# Patient Record
Sex: MALE | Race: WHITE | Employment: FULL TIME | URBAN - METROPOLITAN AREA
[De-identification: names, ages, dates, MRNs, and addresses within clinical notes are randomized per-mention and may not be internally consistent; named-entity substitution may affect disease eponyms.]

---

## 2024-03-04 ENCOUNTER — HOSPITAL ENCOUNTER (EMERGENCY)
Age: 29
Discharge: HOME | End: 2024-03-04
Attending: EMERGENCY MEDICINE
Payer: COMMERCIAL

## 2024-03-04 ENCOUNTER — APPOINTMENT (OUTPATIENT)
Dept: GENERAL RADIOLOGY | Age: 29
End: 2024-03-04
Attending: EMERGENCY MEDICINE
Payer: COMMERCIAL

## 2024-03-04 VITALS
HEART RATE: 81 BPM | DIASTOLIC BLOOD PRESSURE: 88 MMHG | WEIGHT: 290 LBS | HEIGHT: 71 IN | SYSTOLIC BLOOD PRESSURE: 153 MMHG | BODY MASS INDEX: 40.6 KG/M2 | RESPIRATION RATE: 16 BRPM | TEMPERATURE: 98 F | OXYGEN SATURATION: 95 %

## 2024-03-04 DIAGNOSIS — R00.2 PALPITATIONS: Primary | ICD-10-CM

## 2024-03-04 LAB
BASOPHILS # BLD AUTO: 0.06 X10(3) UL (ref 0–0.2)
BASOPHILS NFR BLD AUTO: 0.6 %
BUN BLD-MCNC: 13 MG/DL (ref 7–18)
CHLORIDE BLD-SCNC: 103 MMOL/L (ref 98–112)
CO2 BLD-SCNC: 22 MMOL/L (ref 21–32)
CREAT BLD-MCNC: 0.7 MG/DL
D DIMER PPP FEU-MCNC: <0.27 UG/ML FEU (ref ?–0.5)
EGFRCR SERPLBLD CKD-EPI 2021: 128 ML/MIN/1.73M2 (ref 60–?)
EOSINOPHIL # BLD AUTO: 0.15 X10(3) UL (ref 0–0.7)
EOSINOPHIL NFR BLD AUTO: 1.6 %
ERYTHROCYTE [DISTWIDTH] IN BLOOD BY AUTOMATED COUNT: 12.4 %
GLUCOSE BLD-MCNC: 92 MG/DL (ref 70–99)
HCT VFR BLD AUTO: 42.5 %
HCT VFR BLD CALC: 41 %
HGB BLD-MCNC: 15.2 G/DL
IMM GRANULOCYTES # BLD AUTO: 0.02 X10(3) UL (ref 0–1)
IMM GRANULOCYTES NFR BLD: 0.2 %
ISTAT IONIZED CALCIUM FOR CHEM 8: 1.26 MMOL/L (ref 1.12–1.32)
LYMPHOCYTES # BLD AUTO: 2.62 X10(3) UL (ref 1–4)
LYMPHOCYTES NFR BLD AUTO: 27.2 %
MCH RBC QN AUTO: 30.3 PG (ref 26–34)
MCHC RBC AUTO-ENTMCNC: 35.8 G/DL (ref 31–37)
MCV RBC AUTO: 84.7 FL
MONOCYTES # BLD AUTO: 0.65 X10(3) UL (ref 0.1–1)
MONOCYTES NFR BLD AUTO: 6.8 %
NEUTROPHILS # BLD AUTO: 6.12 X10 (3) UL (ref 1.5–7.7)
NEUTROPHILS # BLD AUTO: 6.12 X10(3) UL (ref 1.5–7.7)
NEUTROPHILS NFR BLD AUTO: 63.6 %
PLATELET # BLD AUTO: 229 10(3)UL (ref 150–450)
POTASSIUM BLD-SCNC: 3.6 MMOL/L (ref 3.6–5.1)
RBC # BLD AUTO: 5.02 X10(6)UL
SODIUM BLD-SCNC: 139 MMOL/L (ref 136–145)
TROPONIN I BLD-MCNC: <0.02 NG/ML
WBC # BLD AUTO: 9.6 X10(3) UL (ref 4–11)

## 2024-03-04 PROCEDURE — 80053 COMPREHEN METABOLIC PANEL: CPT | Performed by: EMERGENCY MEDICINE

## 2024-03-04 PROCEDURE — 99284 EMERGENCY DEPT VISIT MOD MDM: CPT

## 2024-03-04 PROCEDURE — 99285 EMERGENCY DEPT VISIT HI MDM: CPT

## 2024-03-04 PROCEDURE — 93010 ELECTROCARDIOGRAM REPORT: CPT

## 2024-03-04 PROCEDURE — 84484 ASSAY OF TROPONIN QUANT: CPT

## 2024-03-04 PROCEDURE — 80047 BASIC METABLC PNL IONIZED CA: CPT

## 2024-03-04 PROCEDURE — 93005 ELECTROCARDIOGRAM TRACING: CPT

## 2024-03-04 PROCEDURE — 85379 FIBRIN DEGRADATION QUANT: CPT | Performed by: EMERGENCY MEDICINE

## 2024-03-04 PROCEDURE — 84484 ASSAY OF TROPONIN QUANT: CPT | Performed by: EMERGENCY MEDICINE

## 2024-03-04 PROCEDURE — 36415 COLL VENOUS BLD VENIPUNCTURE: CPT

## 2024-03-04 PROCEDURE — 84443 ASSAY THYROID STIM HORMONE: CPT | Performed by: EMERGENCY MEDICINE

## 2024-03-04 PROCEDURE — 71045 X-RAY EXAM CHEST 1 VIEW: CPT | Performed by: EMERGENCY MEDICINE

## 2024-03-04 PROCEDURE — 85025 COMPLETE CBC W/AUTO DIFF WBC: CPT | Performed by: EMERGENCY MEDICINE

## 2024-03-04 RX ORDER — CLINDAMYCIN HYDROCHLORIDE 300 MG/1
CAPSULE ORAL DAILY
COMMUNITY

## 2024-03-05 LAB
ALBUMIN SERPL-MCNC: 3.8 G/DL (ref 3.4–5)
ALBUMIN/GLOB SERPL: 1.2 {RATIO} (ref 1–2)
ALP LIVER SERPL-CCNC: 61 U/L
ALT SERPL-CCNC: 37 U/L
ANION GAP SERPL CALC-SCNC: 7 MMOL/L (ref 0–18)
AST SERPL-CCNC: 22 U/L (ref 15–37)
ATRIAL RATE: 71 BPM
BILIRUB SERPL-MCNC: 0.4 MG/DL (ref 0.1–2)
BUN BLD-MCNC: 13 MG/DL (ref 9–23)
CALCIUM BLD-MCNC: 9.3 MG/DL (ref 8.5–10.1)
CHLORIDE SERPL-SCNC: 107 MMOL/L (ref 98–112)
CO2 SERPL-SCNC: 22 MMOL/L (ref 21–32)
CREAT BLD-MCNC: 0.64 MG/DL
EGFRCR SERPLBLD CKD-EPI 2021: 131 ML/MIN/1.73M2 (ref 60–?)
GLOBULIN PLAS-MCNC: 3.3 G/DL (ref 2.8–4.4)
GLUCOSE BLD-MCNC: 98 MG/DL (ref 70–99)
OSMOLALITY SERPL CALC.SUM OF ELEC: 282 MOSM/KG (ref 275–295)
P AXIS: 36 DEGREES
P-R INTERVAL: 154 MS
POTASSIUM SERPL-SCNC: 3.6 MMOL/L (ref 3.5–5.1)
PROT SERPL-MCNC: 7.1 G/DL (ref 6.4–8.2)
Q-T INTERVAL: 378 MS
QRS DURATION: 90 MS
QTC CALCULATION (BEZET): 410 MS
R AXIS: 53 DEGREES
SODIUM SERPL-SCNC: 136 MMOL/L (ref 136–145)
T AXIS: 2 DEGREES
TROPONIN I SERPL HS-MCNC: 5 NG/L
TSI SER-ACNC: 2.55 MIU/ML (ref 0.36–3.74)
VENTRICULAR RATE: 71 BPM

## 2024-03-05 NOTE — ED INITIAL ASSESSMENT (HPI)
Pt states sob around 1pm. States his HR was elevated at the time. States breathing got better after an hour. Went home and checked BP and SBP was 180. Pt states has had hbp but is not on medication. Reports headache today.

## 2024-03-05 NOTE — DISCHARGE INSTRUCTIONS
Recommend you have a Holter monitor placed.  Call cardiographics tomorrow at [phone] to schedule appointment for this    Follow-up with cardiologist after Holter monitor    Return if palpitations or further symptoms

## 2024-03-05 NOTE — ED PROVIDER NOTES
Patient Seen in: [facility] [place]      History     Chief Complaint   Patient presents with    Dizziness    Hypertension     Stated Complaint: dizziness, HTN    Subjective:   HPI    Patient is a 28-year-old male presents emergency room for evaluation palpitations.  Patient states today at work he felt like his heart was going fast around 10:00.  He looked at his iWatch around 1230 and his heart rate was 140.  His heart rate was as high as 150.  Patient states he felt increasing shortness of breath today.  No chest pain.  Similar episode for couple hours 3 weeks ago where he felt anxious and felt elevated heart rate and palpitations.  Patient denies fever, vomiting or diarrhea.  No history cardiac arrhythmia.  Patient had recent travel to [place] by plane in January.  No history of PE or DVT.  Non-smoker.  No drug use.  No family history of thromboembolic disease.  Patient states symptoms tonight resolved around 5 PM.    Objective:   Past Medical History:   Diagnosis Date    Asthma (HCC)               History reviewed. No pertinent surgical history.             Social History     Socioeconomic History    Marital status: Single   Tobacco Use    Smoking status: Never    Smokeless tobacco: Never   Vaping Use    Vaping Use: Never used   Substance and Sexual Activity    Alcohol use: Yes    Drug use: Never              Review of Systems    Positive for stated complaint: dizziness, HTN  Other systems are as noted in HPI.  Constitutional and vital signs reviewed.      All other systems reviewed and negative except as noted above.    Physical Exam     ED Triage Vitals [03/04/24 1835]   /85   Pulse 88   Resp 16   Temp 97.6 °F (36.4 °C)   Temp src    SpO2 97 %   O2 Device None (Room air)       Current:/88   Pulse 81   Temp 97.6 °F (36.4 °C)   Resp 16   Ht 180.3 cm (5' 11\")   Wt 131.5 kg   SpO2 95%   BMI 40.45 kg/m²         Physical Exam    GENERAL: No acute distress, well appearing  and non-toxic, Alert and oriented X 3   HEENT: Normocephalic, atraumatic.  Moist mucous membranes.  Pupils equal round reactive to light and accommodation, extraocular motion is intact, sclerae white, conjunctiva is pink.  Oropharynx is unremarkable, no exudate.  NECK: Supple, trachea midline, no lymphadenopathy.   LUNG: Lungs clear to auscultation bilaterally, no wheezing, no rales, no rhonchi.  CARDIOVASCULAR: Regular rate and rhythm.  Normal S1S2.  No S3S4 or murmur.  ABDOMEN: Bowel sounds are present. Soft. nondistended, no pulsatile masses. nontender  MUSCULOSKELETAL: No calf tenderness.  Dorsalis and Posterior Tibial pulses present. No clubbing. No cyanosis.  No edema.   SKIN EXAMINATIoN: Warm and dry with normal appearance.  No rashes or lesions.  NEUROLOGICAL:  Motor strength intact all groups.  normal sensation, speech intact    ED Course     Labs Reviewed   D-DIMER - Normal   ISTAT TROPONIN - Normal   CBC WITH DIFFERENTIAL WITH PLATELET    Narrative:     The following orders were created for panel order CBC With Differential With Platelet.  Procedure                               Abnormality         Status                     ---------                               -----------         ------                     CBC W/ DIFFERENTIAL[[id number]]                              Final result                 Please view results for these tests on the individual orders.   COMP METABOLIC PANEL (14)   TROPONIN I HIGH SENSITIVITY   TSH W REFLEX TO FREE T4   POCT ISTAT CHEM8 CARTRIDGE   CBC W/ DIFFERENTIAL     EKG  Rate, Axis and intervals as noted.  I agree with computer interpretation.  Rate: 71  Rhythm: Normal sinus rhythm  Reading: Patient has no acute changes.  T wave inversion lead III and aVF        I personally reviewed xray films of chest and independent interpretation shows no evidence for pneumonia.  I also reviewed formal xray report as read by radiology with findings below:    XR CHEST AP PORTABLE   (CPT=71045)    Result Date: 3/4/2024  PROCEDURE:  XR CHEST AP PORTABLE  (CPT=71045)  TECHNIQUE:  AP chest radiograph was obtained.  COMPARISON:  None.  INDICATIONS:  dizziness, HTN  PATIENT STATED HISTORY: (As transcribed by Technologist)  Pt c/o dizziness,elevated heart rate and sob that began today.    FINDINGS:  Cardiac size and pulmonary vasculature are within normal limits. No pleural effusions. No pneumothorax.             CONCLUSION:  No acute findings.   LOCATION:  [place] [id number]      Dictated by (CST): Louie Silva MD on 3/04/2024 at 8:27 PM     Finalized by (CST): Louie Silva MD on 3/04/2024 at 8:28 PM             MDM      Patient is a 28-year-old male presents emergency room for evaluation of shortness of breath palpitations.  2 episodes over the last 3 weeks.  Differential clues cardiac arrhythmia, PE, ACS, electrolyte abnormality.  Patient had EKG performed which was normal except for a couple T wave inversions.  No signs of ectopy.  Chest x-ray was performed showing no acute process.  Electrolytes normal.  Troponin and D-dimer normal.  Symptoms suspicious for arrhythmia.  Patient was written for a 14-day Holter monitor.  He was given cardiology follow-up.  Advised to return if further palpitations or other problems.    Patient was screened and evaluated during this visit.   As a treating physician attending to the patient, I determined, within reasonable clinical confidence and prior to discharge, that an emergency medical condition was not or was no longer present.  There was no indication for further evaluation, treatment or admission on an emergency basis.  Comprehensive verbal and written discharge and follow-up instructions were provided to help prevent relapse or worsening.  Patient was instructed to follow-up with her primary care provider for further evaluation and treatment, but to return immediately to the ER for worsening, concerning, new, changing or persisting symptoms.  I discussed the  case with the patient and they had no questions, complaints, or concerns.  Patient felt comfortable going home.                  Medical Decision Making      Disposition and Plan     Clinical Impression:  1. Palpitations         Disposition:  Discharge  3/4/2024  9:45 pm    Follow-up:  Jordon Whittaker MD  [address] [place] [id number]  [phone]    Follow up in 1 week(s)            Medications Prescribed:  Discharge Medication List as of 3/4/2024  9:51 PM

## 2024-03-26 ENCOUNTER — HOSPITAL ENCOUNTER (OUTPATIENT)
Dept: CV DIAGNOSTICS | Facility: HOSPITAL | Age: 29
Discharge: HOME | End: 2024-03-26
Attending: INTERNAL MEDICINE
Payer: COMMERCIAL

## 2024-03-26 DIAGNOSIS — R03.0 BORDERLINE HYPERTENSION: ICD-10-CM

## 2024-03-26 DIAGNOSIS — R00.2 PALPITATIONS: ICD-10-CM

## 2024-03-26 PROCEDURE — 93306 TTE W/DOPPLER COMPLETE: CPT | Performed by: INTERNAL MEDICINE

## 2024-05-09 PROBLEM — R03.0 BORDERLINE HYPERTENSION: Status: ACTIVE | Noted: 2024-03-07

## 2024-05-09 PROBLEM — J45.909 ASTHMA (CMD): Status: ACTIVE | Noted: 2024-05-09

## 2024-05-09 PROBLEM — R00.2 PALPITATIONS: Status: ACTIVE | Noted: 2024-03-06

## 2024-05-09 PROBLEM — E66.813 CLASS 3 SEVERE OBESITY DUE TO EXCESS CALORIES WITHOUT SERIOUS COMORBIDITY WITH BODY MASS INDEX (BMI) OF 40.0 TO 44.9 IN ADULT: Status: ACTIVE | Noted: 2024-05-09

## 2024-05-09 PROBLEM — L73.8 FOLLICULITIS BARBAE: Status: ACTIVE | Noted: 2024-05-09

## 2024-05-09 PROBLEM — Z00.01 ENCOUNTER FOR GENERAL ADULT MEDICAL EXAMINATION WITH ABNORMAL FINDINGS: Status: ACTIVE | Noted: 2024-05-09

## 2024-05-18 PROBLEM — R42 DIZZINESS: Status: ACTIVE | Noted: 2024-05-18

## 2024-09-11 PROBLEM — K64.9 HEMORRHOIDS: Status: ACTIVE | Noted: 2024-09-11

## 2024-09-11 PROBLEM — K92.1 BLOOD IN THE STOOL: Status: ACTIVE | Noted: 2024-09-11

## 2025-07-05 ENCOUNTER — HOSPITAL ENCOUNTER (EMERGENCY)
Age: 30
Discharge: STILL ADMITTED OR ADMINISTRATIVE | End: 2025-07-05